# Patient Record
Sex: FEMALE | Race: WHITE | NOT HISPANIC OR LATINO | Employment: OTHER
[De-identification: names, ages, dates, MRNs, and addresses within clinical notes are randomized per-mention and may not be internally consistent; named-entity substitution may affect disease eponyms.]

---

## 2019-09-04 ENCOUNTER — RECORDS - HEALTHEAST (OUTPATIENT)
Dept: ADMINISTRATIVE | Facility: OTHER | Age: 79
End: 2019-09-04

## 2019-09-20 ENCOUNTER — HOSPITAL ENCOUNTER (OUTPATIENT)
Dept: MRI IMAGING | Facility: HOSPITAL | Age: 79
Discharge: HOME | End: 2019-09-20
Attending: PSYCHIATRY & NEUROLOGY

## 2019-09-20 DIAGNOSIS — R41.3 LOSS OF MEMORY: ICD-10-CM

## 2019-09-20 DIAGNOSIS — G31.84 MCI (MILD COGNITIVE IMPAIRMENT): ICD-10-CM

## 2019-09-20 DIAGNOSIS — E53.8 B12 DEFICIENCY: ICD-10-CM

## 2019-10-22 ENCOUNTER — RECORDS - HEALTHEAST (OUTPATIENT)
Dept: LAB | Facility: HOSPITAL | Age: 79
End: 2019-10-22

## 2019-10-22 LAB — VIT B12 SERPL-MCNC: 772 PG/ML (ref 213–816)

## 2020-07-30 PROBLEM — M17.11 PRIMARY OSTEOARTHRITIS OF RIGHT KNEE: Status: ACTIVE | Noted: 2018-08-21

## 2020-07-30 PROBLEM — Z96.652 HISTORY OF LEFT KNEE REPLACEMENT: Status: ACTIVE | Noted: 2018-08-21

## 2020-07-30 PROBLEM — R41.3 MEMORY IMPAIRMENT: Status: ACTIVE | Noted: 2020-07-30

## 2020-07-30 PROBLEM — I10 HYPERTENSION: Status: ACTIVE | Noted: 2020-07-30

## 2020-07-30 PROBLEM — E53.8 COBALAMIN DEFICIENCY: Status: ACTIVE | Noted: 2020-07-30

## 2020-07-30 PROBLEM — Z13.5 SCREENING FOR DIABETIC RETINOPATHY: Status: ACTIVE | Noted: 2018-02-09

## 2020-07-30 PROBLEM — E66.9 OBESITY: Status: ACTIVE | Noted: 2020-07-30

## 2020-07-30 PROBLEM — I25.10 CORONARY ATHEROSCLEROSIS: Status: ACTIVE | Noted: 2020-07-30

## 2020-07-30 PROBLEM — E87.5 HYPERKALEMIA: Status: ACTIVE | Noted: 2018-06-20

## 2020-07-30 PROBLEM — Z87.39 HISTORY OF HERNIATED INTERVERTEBRAL DISC: Status: ACTIVE | Noted: 2020-07-30

## 2020-07-30 PROBLEM — F09 MILD COGNITIVE DISORDER: Status: ACTIVE | Noted: 2020-07-30

## 2020-07-30 PROBLEM — E78.5 HYPERLIPIDEMIA LDL GOAL <100: Status: ACTIVE | Noted: 2020-07-30

## 2020-07-30 PROBLEM — N13.30 HYDRONEPHROSIS, LEFT: Status: ACTIVE | Noted: 2020-07-30

## 2020-08-03 ENCOUNTER — OFFICE VISIT (OUTPATIENT)
Dept: NEUROLOGY | Facility: CLINIC | Age: 80
End: 2020-08-03
Payer: MEDICARE

## 2020-08-03 VITALS — HEIGHT: 60 IN | BODY MASS INDEX: 32.98 KG/M2 | WEIGHT: 168 LBS

## 2020-08-03 DIAGNOSIS — F09 MILD COGNITIVE DISORDER: Primary | ICD-10-CM

## 2020-08-03 PROCEDURE — 99214 OFFICE O/P EST MOD 30 MIN: CPT | Mod: 95 | Performed by: PSYCHIATRY & NEUROLOGY

## 2020-08-03 RX ORDER — METOPROLOL TARTRATE 50 MG
50 TABLET ORAL 2 TIMES DAILY
COMMUNITY
Start: 2020-05-19

## 2020-08-03 RX ORDER — ASPIRIN 81 MG/1
81 TABLET, CHEWABLE ORAL DAILY
COMMUNITY

## 2020-08-03 RX ORDER — LANOLIN ALCOHOL/MO/W.PET/CERES
1000 CREAM (GRAM) TOPICAL
COMMUNITY
Start: 2019-12-16

## 2020-08-03 RX ORDER — FUROSEMIDE 20 MG
10 TABLET ORAL DAILY
COMMUNITY
Start: 2020-04-09

## 2020-08-03 RX ORDER — DONEPEZIL HYDROCHLORIDE 5 MG/1
5 TABLET, FILM COATED ORAL AT BEDTIME
Qty: 30 TABLET | Refills: 11 | Status: SHIPPED | OUTPATIENT
Start: 2020-08-03

## 2020-08-03 RX ORDER — AMOXICILLIN 500 MG
1 CAPSULE ORAL DAILY
COMMUNITY
Start: 2019-09-04

## 2020-08-03 RX ORDER — LOSARTAN POTASSIUM 100 MG/1
100 TABLET ORAL DAILY
COMMUNITY
Start: 2019-09-04

## 2020-08-03 RX ORDER — PEN NEEDLE, DIABETIC 31 GX5/16"
NEEDLE, DISPOSABLE MISCELLANEOUS
COMMUNITY
Start: 2019-11-03

## 2020-08-03 RX ORDER — ESTRADIOL 0.1 MG/G
CREAM VAGINAL
COMMUNITY
Start: 2020-06-09

## 2020-08-03 RX ORDER — ROSUVASTATIN CALCIUM 40 MG/1
40 TABLET, COATED ORAL DAILY
COMMUNITY
Start: 2019-09-04

## 2020-08-03 ASSESSMENT — MIFFLIN-ST. JEOR: SCORE: 1143.54

## 2020-08-03 NOTE — NURSING NOTE
Chief Complaint   Patient presents with     Follow Up     Short Term memory loss.  1-2 years not worse.   passed away and maybe affecting memory.     Visit: via phone visit. Pt daughter to [facility] [place]:#[phone]. Daughter Jaquline.  If not able to connect through virtual- please call same number for phone call.   Paola Go, ATC

## 2020-08-03 NOTE — PROGRESS NOTES
"Video follow-up visit requested by patient  Video follow-up visit due to COVID-19 pandemic  Video follow-up visit done on [employer]  Text number [phone]      .Patient is being evaluated via a billable video visit. The patient has been notified of following:     \"This video visit will be conducted via a call between you and your physician/provider. We have found that certain health care needs can be provided without the need for an in-person physical exam. This service lets us provide the care you need with a video conversation. If a prescription is necessary we can send it directly to your pharmacy. If lab work is needed we can place an order for that and you can then stop by our lab to have the test done at a later time.    If during the course of the call the physician/provider feels a video visit is not appropriate, you will not be charged for this service.    Physician has received verbal consent for a Video Visit from the patient? YES    Patient would like the video invitation sent by: Text number [phone]    Video Visit Details    Type of service: Video Visit    Video Start Time: 1 PM    Video End Time (time video stopped): 1:30 PM    Originating Location (pt. Location): Patient's Home    Distant Location (provider location): [facility] [place]     Mode of Communication: Video Conference via Invacio/ Vivakor                      Chief complaint    follow-up visit August 3, 2020      Patient is an 80-year-old with difficulty with  Memory problems onset 2019  B12 deficiency as low as 208  Placed on B12 supplement  B12 2019 was 772    in March which was a major stressor    Currently living in an apartment  Used to go down for meals Monday through Friday but now eat alone in the apartment  She does skip meals and we had a long discussion about proper nutrition and considering using boost or Ensure  Follows with a nephrologist and has had difficulty with " hyponatremia and was treated with some Lasix and fluid adjustments    Patient lives in her own apartment  Has difficulty remembering people's names and places  Does not drive due to her diabetic retinopathy and macular degeneration  Has difficulty getting around uses a 4 wheeled walker with hand break but cannot ambulate inside the apartment by touching furniture  No significant falls    Patient has more of any pneumonia difficulty expressing herself  No heartburn  Does have maybe a loose stool 1-2 times per day    Seems to be stable compared to 3 months ago per her daughter    History of Present Illness    Chief complaint  Memory trouble going on since at least Jan. 2019  Trouble coming up with words forgetfulness   feels may have been going on for a year with naming things  Does use a walker for her bad back    Work-up  EEG September 25, 2019 3-5 Hz right hemisphere slowing with 8 Hz percent rhythm consistent with a right hemisphere cerebral dysfunction  MRI scan brain September 20, 2019  A. Moderate T2 small vessel changes  B. Frontal atrophy greater than other areas question FTD  B12 of 208 she was started on B12 replacement orally we rechecked Oct 2019, 772  [place] cognitive assessment score 19 out of 30, September 2019        Reviewed current work-up with patient and  concerned that this could be B12 but also concerned more of a FTD or frontotemporal dementia a form of dementia that sometimes is more left hemisphere or more right hemisphere in nature has different symptom complexes which we will watch for                Past medical history  Hypertension and hyperlipidemia  Diabetes  Coronary artery disease  Mitral valve regurgitation  Diabetic neuropathy  Macular degeneration  Diabetic retinopathy  Chronic back pain  Hydronephrosis on the left with previous stent and pyelonephritis  Osteoarthritis of the right knee  Left knee arthroplasty in 2011      Habits  Patient smoked in the past for 20  years but not currently  She might have a glass of wine 2-3 times per month but not often    Family history  Patient's recall is poor   Mother with stroke  at age 85 may have had some memory problems later on  Father  of cancer at age 86 may have had some memory problems later on  4 brothers could not remember their health history but said they have some different things  3 sisters that are younger one this older with a little bit of memory trouble her mood problems        Work-up review  B12   TSH 1.07  Hemoglobin A1c 7.0%  B12 Oct 2019, 772  MOCA 2019      Some labs 2019  Sodium 132 potassium 5.1 BUN 8 creatinine 0.7 glucose 253        Review of Systems   No headache no chest pain no shortness of breath no fever chills  No cough    Past history of some migraines but were not a problem during this visit    Difficulty with anxiety  Lost her  in 2020  Handling things relatively well    Has chronic balance difficulty uses the 4 wheeled walker but no falls  Has had some past urinary urgency  Has vision changes right eye greater than the left    Decreased appetite  Some mild constipation    Sleep is slightly disrupted 4 hours and then kind of goes in and out but does not nap during the day    No lightheadedness      No heartburn  No diarrhea, 1-2 loose stools per day maximum  No blood in the stool no nausea or vomiting    Physical Exam   Patient is alert and attentive  Daughter help with some naming exercises and seemed to do okay  Oriented x3  No aphasia during this phone visit  Question whether she has a little bit of a anomia    Sentence can get perplexed with questioning and might ask things over and over but not so much today as the last visit    Patient could not come up with a year X that was , then change it to , and sent that was wrong  Could come up with the president but said it is that got you know  Season was vague but she told me the month was  February or March and that maybe we are getting into spring weather which is wrong one was at the end of summer      No neglect    Cranials 2 through 12 significant for  Chronic decreased vision in the right eye compared to the left    No drift of the upper extremities no tremor    Moves legs well while seated  Last visit had decreased reflexes in the lower extremity    Able to push off with her arms to stand up marches in place okay uses furniture to walk around the apartment as a walker to walk out in the hallway           Assessment/Plan     B12 deficiency (E53.8) Continue B12 1000 mcg once per day    Discussed good nutrition and reasonable activity safely      MCI (mild cognitive impairment) (G31.84)  Developing probably some dementia  Question if this is more of an FTD with word finding difficulty  She seems to talk around the question describes things more than tell me what they are      Discussed side effects and benefits of Aricept    Start Aricept 5 mg once per day at nighttime    Follow-up visit in 3 months  Video follow-up visit so that I can at least see how she is doing    30 minutes total care time today greater than for present face-to-face patient and daughter discussing the above

## 2020-08-03 NOTE — LETTER
"    8/3/2020         RE: Rachel Jeffrey  [address]  [address]  [place] [address]        Dear Colleague,    Thank you for referring your patient, Rachel Jeffrey, to the [facility] [place]. Please see a copy of my visit note below.    Video follow-up visit requested by patient  Video follow-up visit due to COVID-19 pandemic  Video follow-up visit done on DD[employer]imity  Text number [phone]      .Patient is being evaluated via a billable video visit. The patient has been notified of following:     \"This video visit will be conducted via a call between you and your physician/provider. We have found that certain health care needs can be provided without the need for an in-person physical exam. This service lets us provide the care you need with a video conversation. If a prescription is necessary we can send it directly to your pharmacy. If lab work is needed we can place an order for that and you can then stop by our lab to have the test done at a later time.    If during the course of the call the physician/provider feels a video visit is not appropriate, you will not be charged for this service.    Physician has received verbal consent for a Video Visit from the patient? YES    Patient would like the video invitation sent by: Text number [phone]    Video Visit Details    Type of service: Video Visit    Video Start Time: 1 PM    Video End Time (time video stopped): 1:30 PM    Originating Location (pt. Location): Patient's Home    Distant Location (provider location): [facility] [place]     Mode of Communication: Video Conference via Gamaliel/ clarissa                      Chief complaint    follow-up visit August 3, 2020      Patient is an 80-year-old with difficulty with  Memory problems onset 2019  B12 deficiency as low as 208  Placed on B12 supplement  B12 2019 was 772    in March which was a major stressor    Currently living in an " apartment  Used to go down for meals Monday through Friday but now eat alone in the apartment  She does skip meals and we had a long discussion about proper nutrition and considering using boost or Ensure  Follows with a nephrologist and has had difficulty with hyponatremia and was treated with some Lasix and fluid adjustments    Patient lives in her own apartment  Has difficulty remembering people's names and places  Does not drive due to her diabetic retinopathy and macular degeneration  Has difficulty getting around uses a 4 wheeled walker with hand break but cannot ambulate inside the apartment by touching furniture  No significant falls    Patient has more of any pneumonia difficulty expressing herself  No heartburn  Does have maybe a loose stool 1-2 times per day    Seems to be stable compared to 3 months ago per her daughter    History of Present Illness    Chief complaint  Memory trouble going on since at least Jan. 2019  Trouble coming up with words forgetfulness   feels may have been going on for a year with naming things  Does use a walker for her bad back    Work-up  EEG September 25, 2019 3-5 Hz right hemisphere slowing with 8 Hz percent rhythm consistent with a right hemisphere cerebral dysfunction  MRI scan brain September 20, 2019  A. Moderate T2 small vessel changes  B. Frontal atrophy greater than other areas question FTD  B12 of 208 she was started on B12 replacement orally we rechecked Oct 2019, 772  [place] cognitive assessment score 19 out of 30, September 2019        Reviewed current work-up with patient and  concerned that this could be B12 but also concerned more of a FTD or frontotemporal dementia a form of dementia that sometimes is more left hemisphere or more right hemisphere in nature has different symptom complexes which we will watch for                Past medical history  Hypertension and hyperlipidemia  Diabetes  Coronary artery disease  Mitral valve  regurgitation  Diabetic neuropathy  Macular degeneration  Diabetic retinopathy  Chronic back pain  Hydronephrosis on the left with previous stent and pyelonephritis  Osteoarthritis of the right knee  Left knee arthroplasty in       Habits  Patient smoked in the past for 20 years but not currently  She might have a glass of wine 2-3 times per month but not often    Family history  Patient's recall is poor   Mother with stroke  at age 85 may have had some memory problems later on  Father  of cancer at age 86 may have had some memory problems later on  4 brothers could not remember their health history but said they have some different things  3 sisters that are younger one this older with a little bit of memory trouble her mood problems        Work-up review  B12   TSH 1.07  Hemoglobin A1c 7.0%  B12 Oct 2019, 772  MOCA 2019      Some labs 2019  Sodium 132 potassium 5.1 BUN 8 creatinine 0.7 glucose 253        Review of Systems   No headache no chest pain no shortness of breath no fever chills  No cough    Past history of some migraines but were not a problem during this visit    Difficulty with anxiety  Lost her  in 2020  Handling things relatively well    Has chronic balance difficulty uses the 4 wheeled walker but no falls  Has had some past urinary urgency  Has vision changes right eye greater than the left    Decreased appetite  Some mild constipation    Sleep is slightly disrupted 4 hours and then kind of goes in and out but does not nap during the day    No lightheadedness      No heartburn  No diarrhea, 1-2 loose stools per day maximum  No blood in the stool no nausea or vomiting    Physical Exam   Patient is alert and attentive  Daughter help with some naming exercises and seemed to do okay  Oriented x3  No aphasia during this phone visit  Question whether she has a little bit of a anomia    Sentence can get perplexed with questioning and might ask things  over and over but not so much today as the last visit    Patient could not come up with a year X that was 19, then change it to 2010, and sent that was wrong  Could come up with the president but said it is that got you know  Season was vague but she told me the month was February or March and that maybe we are getting into spring weather which is wrong one was at the end of summer      No neglect    Cranials 2 through 12 significant for  Chronic decreased vision in the right eye compared to the left    No drift of the upper extremities no tremor    Moves legs well while seated  Last visit had decreased reflexes in the lower extremity    Able to push off with her arms to stand up marches in place okay uses furniture to walk around the apartment as a walker to walk out in the hallway           Assessment/Plan     B12 deficiency (E53.8) Continue B12 1000 mcg once per day    Discussed good nutrition and reasonable activity safely      MCI (mild cognitive impairment) (G31.84)  Developing probably some dementia  Question if this is more of an FTD with word finding difficulty  She seems to talk around the question describes things more than tell me what they are      Discussed side effects and benefits of Aricept    Start Aricept 5 mg once per day at nighttime    Follow-up visit in 3 months  Video follow-up visit so that I can at least see how she is doing    30 minutes total care time today greater than for present face-to-face patient and daughter discussing the above      Again, thank you for allowing me to participate in the care of your patient.        Sincerely,        Luiz Beverly MD

## 2021-03-15 ENCOUNTER — RECORDS - HEALTHEAST (OUTPATIENT)
Dept: LAB | Facility: CLINIC | Age: 81
End: 2021-03-15

## 2021-03-17 LAB
ANION GAP SERPL CALCULATED.3IONS-SCNC: 12 MMOL/L (ref 5–18)
BUN SERPL-MCNC: 22 MG/DL (ref 8–28)
CALCIUM SERPL-MCNC: 10.8 MG/DL (ref 8.5–10.5)
CHLORIDE BLD-SCNC: 100 MMOL/L (ref 98–107)
CO2 SERPL-SCNC: 24 MMOL/L (ref 22–31)
CREAT SERPL-MCNC: 0.75 MG/DL (ref 0.6–1.1)
ERYTHROCYTE [DISTWIDTH] IN BLOOD BY AUTOMATED COUNT: 21.3 % (ref 11–14.5)
GFR SERPL CREATININE-BSD FRML MDRD: >60 ML/MIN/1.73M2
GLUCOSE BLD-MCNC: 255 MG/DL (ref 70–125)
HBA1C MFR BLD: 9.5 %
HCT VFR BLD AUTO: 40.4 % (ref 35–47)
HGB BLD-MCNC: 13 G/DL (ref 12–16)
MCH RBC QN AUTO: 26.7 PG (ref 27–34)
MCHC RBC AUTO-ENTMCNC: 32.2 G/DL (ref 32–36)
MCV RBC AUTO: 83 FL (ref 80–100)
PLATELET # BLD AUTO: 326 THOU/UL (ref 140–440)
PMV BLD AUTO: 11.4 FL (ref 8.5–12.5)
POTASSIUM BLD-SCNC: 3.8 MMOL/L (ref 3.5–5)
RBC # BLD AUTO: 4.87 MILL/UL (ref 3.8–5.4)
SODIUM SERPL-SCNC: 136 MMOL/L (ref 136–145)
TSH SERPL DL<=0.005 MIU/L-ACNC: 0.99 UIU/ML (ref 0.3–5)
WBC: 8.8 THOU/UL (ref 4–11)